# Patient Record
Sex: MALE | Race: OTHER | NOT HISPANIC OR LATINO | Employment: STUDENT | URBAN - METROPOLITAN AREA
[De-identification: names, ages, dates, MRNs, and addresses within clinical notes are randomized per-mention and may not be internally consistent; named-entity substitution may affect disease eponyms.]

---
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
## 2025-04-12 ENCOUNTER — HOSPITAL ENCOUNTER (EMERGENCY)
Facility: OTHER | Age: 20
Discharge: PSYCHIATRIC OR SUBSTANCE USE FACILITY | End: 2025-04-12
Attending: EMERGENCY MEDICINE
Payer: COMMERCIAL

## 2025-04-12 VITALS
HEART RATE: 50 BPM | OXYGEN SATURATION: 100 % | HEIGHT: 72 IN | RESPIRATION RATE: 12 BRPM | DIASTOLIC BLOOD PRESSURE: 62 MMHG | SYSTOLIC BLOOD PRESSURE: 128 MMHG | TEMPERATURE: 98 F | WEIGHT: 150 LBS | BODY MASS INDEX: 20.32 KG/M2

## 2025-04-12 DIAGNOSIS — F29 PSYCHOSIS, UNSPECIFIED PSYCHOSIS TYPE: ICD-10-CM

## 2025-04-12 DIAGNOSIS — Z00.8 MEDICAL CLEARANCE FOR PSYCHIATRIC ADMISSION: Primary | ICD-10-CM

## 2025-04-12 LAB
ABSOLUTE EOSINOPHIL (OHS): 0.02 K/UL
ABSOLUTE MONOCYTE (OHS): 0.55 K/UL (ref 0.3–1)
ABSOLUTE NEUTROPHIL COUNT (OHS): 4.1 K/UL (ref 1.8–7.7)
ALBUMIN SERPL BCP-MCNC: 4.4 G/DL (ref 3.5–5.2)
ALP SERPL-CCNC: 82 UNIT/L (ref 40–150)
ALT SERPL W/O P-5'-P-CCNC: 20 UNIT/L (ref 10–44)
AMPHET UR QL SCN: NEGATIVE
ANION GAP (OHS): 12 MMOL/L (ref 8–16)
APAP SERPL-MCNC: <3 UG/ML (ref 10–20)
AST SERPL-CCNC: 28 UNIT/L (ref 11–45)
BARBITURATE SCN PRESENT UR: NEGATIVE
BASOPHILS # BLD AUTO: 0.01 K/UL
BASOPHILS NFR BLD AUTO: 0.2 %
BENZODIAZ UR QL SCN: NEGATIVE
BILIRUB SERPL-MCNC: 0.4 MG/DL (ref 0.1–1)
BILIRUB UR QL STRIP.AUTO: NEGATIVE
BUN SERPL-MCNC: 18 MG/DL (ref 6–20)
CALCIUM SERPL-MCNC: 9.7 MG/DL (ref 8.7–10.5)
CANNABINOIDS UR QL SCN: ABNORMAL
CHLORIDE SERPL-SCNC: 104 MMOL/L (ref 95–110)
CLARITY UR: CLEAR
CO2 SERPL-SCNC: 21 MMOL/L (ref 23–29)
COCAINE UR QL SCN: NEGATIVE
COLOR UR AUTO: YELLOW
CREAT SERPL-MCNC: 1.1 MG/DL (ref 0.5–1.4)
CREAT UR-MCNC: 267.5 MG/DL (ref 23–375)
ERYTHROCYTE [DISTWIDTH] IN BLOOD BY AUTOMATED COUNT: 12.8 % (ref 11.5–14.5)
ETHANOL SERPL-MCNC: <10 MG/DL
GFR SERPLBLD CREATININE-BSD FMLA CKD-EPI: >60 ML/MIN/1.73/M2
GLUCOSE SERPL-MCNC: 102 MG/DL (ref 70–110)
GLUCOSE UR QL STRIP: NEGATIVE
HCT VFR BLD AUTO: 41.9 % (ref 40–54)
HGB BLD-MCNC: 14.5 GM/DL (ref 14–18)
HGB UR QL STRIP: NEGATIVE
IMM GRANULOCYTES # BLD AUTO: 0.02 K/UL (ref 0–0.04)
IMM GRANULOCYTES NFR BLD AUTO: 0.3 % (ref 0–0.5)
KETONES UR QL STRIP: NEGATIVE
LEUKOCYTE ESTERASE UR QL STRIP: NEGATIVE
LYMPHOCYTES # BLD AUTO: 1.27 K/UL (ref 1–4.8)
MCH RBC QN AUTO: 29.2 PG (ref 27–31)
MCHC RBC AUTO-ENTMCNC: 34.6 G/DL (ref 32–36)
MCV RBC AUTO: 84 FL (ref 82–98)
METHADONE UR QL SCN: NEGATIVE
NITRITE UR QL STRIP: NEGATIVE
NUCLEATED RBC (/100WBC) (OHS): 0 /100 WBC
OPIATES UR QL SCN: NEGATIVE
PCP UR QL: NEGATIVE
PH UR STRIP: 6 [PH]
PLATELET # BLD AUTO: 240 K/UL (ref 150–450)
PMV BLD AUTO: 9.7 FL (ref 9.2–12.9)
POTASSIUM SERPL-SCNC: 4.7 MMOL/L (ref 3.5–5.1)
PROT SERPL-MCNC: 8.2 GM/DL (ref 6–8.4)
PROT UR QL STRIP: ABNORMAL
RBC # BLD AUTO: 4.97 M/UL (ref 4.6–6.2)
RELATIVE EOSINOPHIL (OHS): 0.3 %
RELATIVE LYMPHOCYTE (OHS): 21.3 % (ref 18–48)
RELATIVE MONOCYTE (OHS): 9.2 % (ref 4–15)
RELATIVE NEUTROPHIL (OHS): 68.7 % (ref 38–73)
SODIUM SERPL-SCNC: 137 MMOL/L (ref 136–145)
SP GR UR STRIP: 1.03
TSH SERPL-ACNC: 1.36 UIU/ML (ref 0.4–4)
UROBILINOGEN UR STRIP-ACNC: NEGATIVE EU/DL
WBC # BLD AUTO: 5.97 K/UL (ref 3.9–12.7)

## 2025-04-12 PROCEDURE — 85025 COMPLETE CBC W/AUTO DIFF WBC: CPT

## 2025-04-12 PROCEDURE — 80053 COMPREHEN METABOLIC PANEL: CPT

## 2025-04-12 PROCEDURE — G0425 INPT/ED TELECONSULT30: HCPCS | Mod: GT,,, | Performed by: PSYCHIATRY & NEUROLOGY

## 2025-04-12 PROCEDURE — 99285 EMERGENCY DEPT VISIT HI MDM: CPT

## 2025-04-12 PROCEDURE — 80143 DRUG ASSAY ACETAMINOPHEN: CPT

## 2025-04-12 PROCEDURE — 82077 ASSAY SPEC XCP UR&BREATH IA: CPT

## 2025-04-12 PROCEDURE — 84443 ASSAY THYROID STIM HORMONE: CPT

## 2025-04-12 PROCEDURE — 80307 DRUG TEST PRSMV CHEM ANLYZR: CPT

## 2025-04-12 PROCEDURE — 81003 URINALYSIS AUTO W/O SCOPE: CPT

## 2025-04-12 NOTE — ED NOTES
Patient Belongings:   1 tan pair slippers   1 pair white and blue socks   1 multicolor t-shirt   1 pair blue pants       Patient Valuables ([id number]):  1 cell phone  1 pair airpods in case   1 silver wallet   1 [place] probationary license   1 broken [employer] [facility] student ID   $5 bill

## 2025-04-12 NOTE — CONSULTS
[facility]  Psychiatry  Telepsychiatry Consult Note    Please see previous notes:    Patient agreeable to consultation via telepsychiatry.  Some time maybe that is 45 minutes away [facility] the leave a 45 minutes he might want to come fatigued or something  Tele-Consultation from Psychiatry started: 4/12/2025 at 247 pm  The chief complaint leading to psychiatric consultation is: threathening behavior      Pt brought by [facility] police called them to transport pt, pts roommates said he called his parents and was threatening to kill them.     This consultation was requested by Arden Patel PA-C , the Emergency Department attending physician.  The location of the consulting psychiatrist is [facility]  The patient location is  [facility] EMERGENCY DEPARTMENT   The patient arrived at the ED at: this afternoon    Also present with the patient at the time of the consultation: rossy    Patient Identification:   Gogo Watson is a 20 y.o. male.    Patient information was obtained from patient and primary team.  Patient presented involuntarily to the Emergency Department by police    Inpatient consult to Telemedicine - Psychiatry  Consult performed by: Angi Andrade MD  Consult ordered by: Arden Patel PA-C  Reason for consult: threathening behavior        Teleconsult Time Documentation      sent for evaluation        Pt brought by [facility] police called them to transport pt, pts roommates said he called his parents and was threatening to kill them.       Gogo Watson is a 20 y.o. male [employer] with no pertinent past medical history presenting to the emergency department via [facility] for evaluation of mental health. It was reported that his roommate called the police on him after he got angry with his parents over the phone. It was reported that he threatened to kill them via phone. He reports taking LSD approximately 3-4 days ago and then spoke to his parents where he had an angry outburst  toward them. He currently denies associated SI, HI, or AH/VH. He states his parents do live in [place]. He states he has no access to any weapons. He states that he does not feel safe in his current living arrangement, and feels being watched by his roommates.  He reports occasional LSD use, daily tobacco use and daily marijuana use (most recently this morning). He denies any alcohol consumption. He currently denies any medical complaints or concerns.   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  He has been using acid fairly regularly for the last year and smokes marijuana daily.  Several days ago his roommates witnessed him threatened to kill his parents during an phone argument with them.  They live in [place].  His behavior has been odd and suspicious seeming to them apparently.  They called the police today to have him brought in for evaluation stating that they do not feel safe.  On interview the patient is initially calm and cooperative.  Later on when challenged somewhat more he becomes irritable.  He admits to having yelled at his parents a few days ago out of anger that they were not more supportive of the expression of emotion during his childhood.  He admits to the substance use.  He states that he has only been micro dosing this year taking a 6th of a tab which he estimates his 30 mcg every 3 days.  He states he does this to get in touch with his emotions.  He reports that sometimes he experiences paranoia when high but he is unaware of paranoia at other times.  He states that sometimes he feels that he is being watched when he uses and laughs when making this statement.  He does not have awareness of other or any other negative sequelae and isn't concerned about his substance use.  He is also using nicotine pouches daily.  He denies depressed mood.  He denies suicidal or homicidal ideation.  He does not feel  "that he is paranoid.  However he admits that when he uses substances he has been experiencing more paranoid recently.  He admits that he did not sleep at all last night due to feeling that something was off and it has not been safe for me."  He states there is many reasons why he is unsafe.  He states he could talk about them but he will not.  He does not feel safe living with his roommates.  He will not elaborate.  His plan were he to be discharged is to go visit his grandmother he states.  He then been backpacks and states he would not leave school but admits he does not have another place to live.  I attempted to engage him in a conversation about having made threats to harm other people in the same week that he stating that he feels that the people he lives with are dangerous to him and this being of concern.  He became intensely irritable at that point and argumentative but under control.   Remainder of the interview deferred for that reason    Psychiatric History:   Previous Psychiatric Hospitalizations: No       Previous Suicide Attempts: no    History of Violence: no       Substance Abuse History:  Tobacco:Yes  Alcohol: Yes  Illicit Substances:Yes        Social History:  Developmental/Childhood:Achieved all developmental milestones timely  *Education: College student  Employment Status/Finances: Student       Housing Status:  With [employer] [facility] roommates     history:  NO       Psychiatric Mental Status Exam:  Arousal: alert  Sensorium/Orientation: oriented to grossly intact  Behavior/Cooperation: normal, cooperative   Speech: normal tone, normal rate, normal pitch, normal volume  Language: grossly intact  Mood: " I'm fine "   Affect: labile  Thought Process: illogical  Thought Content:   Auditory hallucinations: NO  Visual hallucinations: NO  Paranoia: YES:       Delusions:   Probable    Suicidal ideation: NO  Homicidal ideation: recent threat but denies  Attention/Concentration:  intact  Memory: "    Recent:  Decreased   Remote: Intact   3/3 immediate,  /3 at 5 min  Fund of Knowledge: Aware of current events   Abstract reasoning:  Defer  Insight: poor awareness of illness  Judgment: limited      Past Medical History: History reviewed. No pertinent past medical history.   Laboratory Data:   Labs Reviewed   COMPREHENSIVE METABOLIC PANEL - Abnormal       Result Value    Sodium 137      Potassium 4.7      Chloride 104      CO2 21 (*)     Glucose 102      BUN 18      Creatinine 1.1      Calcium 9.7      Protein Total 8.2      Albumin 4.4      Bilirubin Total 0.4      ALP 82      AST 28      ALT 20      Anion Gap 12      eGFR >60      Narrative:     Specimen slightly hemolyzed.     URINALYSIS, REFLEX TO URINE CULTURE - Abnormal    Color, UA Yellow      Appearance, UA Clear      pH, UA 6.0      Spec Grav UA 1.030      Protein, UA Trace (*)     Glucose, UA Negative      Ketones, UA Negative      Bilirubin, UA Negative      Blood, UA Negative      Nitrites, UA Negative      Urobilinogen, UA Negative      Leukocyte Esterase, UA Negative     ACETAMINOPHEN LEVEL - Abnormal    Acetaminophen Level <3.0 (*)    ALCOHOL,MEDICAL (ETHANOL) - Normal    Alcohol, Serum <10     CBC WITH DIFFERENTIAL - Normal    WBC 5.97      RBC 4.97      HGB 14.5      HCT 41.9      MCV 84      MCH 29.2      MCHC 34.6      RDW 12.8      Platelet Count 240      MPV 9.7      Nucleated RBC 0      Neut % 68.7      Lymph % 21.3      Mono % 9.2      Eos % 0.3      Basophil % 0.2      Imm Grans % 0.3      Neut # 4.10      Lymph # 1.27      Mono # 0.55      Eos # 0.02      Baso # 0.01      Imm Grans # 0.02     CBC W/ AUTO DIFFERENTIAL    Narrative:     The following orders were created for panel order CBC auto differential.  Procedure                               Abnormality         Status                     ---------                               -----------         ------                     CBC with Differential[[id number]]        Normal               Final result                 Please view results for these tests on the individual orders.   TSH   DRUG SCREEN PANEL, URINE EMERGENCY   GREY TOP URINE HOLD          Allergies:    Review of patient's allergies indicates:  No Known Allergies    Medications in ER: Medications - No data to display    Medications at home:  None    No new subjective & objective note has been filed under this hospital service since the last note was generated.      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  Psychoactive substance use disorder cannabis use disorder.  Gravely disabled and possibly a threat to others.      Rec:  Inpatient psychiatric hospitalization for a full evaluation stabilization and treatment     Plan of Care communicated to: Dr. Mckee    Time with patient: 30 min      More than 50% of the time was spent counseling/coordinating care    Consulting clinician was informed of the encounter and consult note.    Consultation ended: 4/12/2025 at 217  pm    Angi Andrade MD   Psychiatry  [facility]

## 2025-04-12 NOTE — ED NOTES
Pt's mom called ER and was updated by me that we do not have consent to share more information on pt. Pt confirmed that he does not consent to share information with any family.

## 2025-04-12 NOTE — ED NOTES
Pt requesting that I notify his parents, next of kin. [facility] patient's mother at [phone] and Wm patient's father. [phone]

## 2025-04-12 NOTE — ED PROVIDER NOTES
Encounter Date: 4/12/2025    SCRIBE #1 NOTE: IMarsha, am scribing for, and in the presence of,  Arden Patel PA-C. I have scribed the following portions of the note - Other sections scribed: HPI, ROS, PE.   SCRIBE #2 NOTE: IAustin, am scribing for, and in the presence of,  Arden Patel PA-C. I have scribed the remaining portions of the note not scribed by Scribe #1.     History     Chief Complaint   Patient presents with    sent for evaluation     Pt brought by [facility] states [facility] police called them to transport pt, pts roommates said he called his parents and was threatening to kill them.      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 It was reported that his roommate called the police on him after he got angry with his parents over the phone. It was reported that he threatened to kill them via phone. He reports taking LSD approximately 3-4 days ago and then spoke to his parents where he had an angry outburst toward them. He currently denies associated SI, HI, or AH/VH. He states his parents do live in [place]. He states he has no access to any weapons. He states that he does not feel safe in his current living arrangement, and feels being watched by his roommates.  He reports occasional LSD use, daily tobacco use and daily marijuana use (most recently this morning). He denies any alcohol consumption. He currently denies any medical complaints or concerns.     The history is provided by the patient.     Review of patient's allergies indicates:  No Known Allergies  History reviewed. No pertinent past medical history.  History reviewed. No pertinent surgical history.  No family history on file.  Social History[1]  Review of Systems   Constitutional:  Negative for chills and fever.   HENT:  Negative for congestion, rhinorrhea and sore throat.    Respiratory:  Negative for  cough and shortness of breath.    Cardiovascular:  Negative for chest pain.   Gastrointestinal:  Negative for abdominal pain, diarrhea, nausea and vomiting.   Genitourinary:  Negative for dysuria, frequency and urgency.   Musculoskeletal:  Negative for back pain.   Skin:  Negative for rash.   Neurological:  Negative for dizziness and headaches.   Psychiatric/Behavioral:  Negative for confusion.        Physical Exam     Initial Vitals [04/12/25 1334]   BP Pulse Resp Temp SpO2   136/73 70 18 98 °F (36.7 °C) 100 %      MAP       --         Physical Exam    Vitals reviewed.  Constitutional: He appears well-developed and well-nourished. No distress.   HENT:   Head: Normocephalic and atraumatic.   Eyes: Conjunctivae and EOM are normal.   Neck: Neck supple.   Normal range of motion.  Cardiovascular:  Normal rate, regular rhythm, normal heart sounds and intact distal pulses.           Pulmonary/Chest: Breath sounds normal. No respiratory distress. He has no wheezes. He has no rhonchi. He has no rales. He exhibits no tenderness.   Abdominal: Abdomen is soft. Bowel sounds are normal. He exhibits no distension and no mass. There is no abdominal tenderness. There is no rebound and no guarding.   Musculoskeletal:         General: No edema. Normal range of motion.      Cervical back: Normal range of motion and neck supple.     Neurological: He is alert and oriented to person, place, and time. He has normal strength.   Skin: Skin is warm and dry.   Psychiatric:   Calm and cooperative; flat affect; no HI, SI, delusions, or hallucinations         ED Course   Procedures  Labs Reviewed   COMPREHENSIVE METABOLIC PANEL - Abnormal       Result Value    Sodium 137      Potassium 4.7      Chloride 104      CO2 21 (*)     Glucose 102      BUN 18      Creatinine 1.1      Calcium 9.7      Protein Total 8.2      Albumin 4.4      Bilirubin Total 0.4      ALP 82      AST 28      ALT 20      Anion Gap 12      eGFR >60      Narrative:     Specimen  slightly hemolyzed.     URINALYSIS, REFLEX TO URINE CULTURE - Abnormal    Color, UA Yellow      Appearance, UA Clear      pH, UA 6.0      Spec Grav UA 1.030      Protein, UA Trace (*)     Glucose, UA Negative      Ketones, UA Negative      Bilirubin, UA Negative      Blood, UA Negative      Nitrites, UA Negative      Urobilinogen, UA Negative      Leukocyte Esterase, UA Negative     ACETAMINOPHEN LEVEL - Abnormal    Acetaminophen Level <3.0 (*)    TSH - Normal    TSH 1.360     ALCOHOL,MEDICAL (ETHANOL) - Normal    Alcohol, Serum <10     CBC WITH DIFFERENTIAL - Normal    WBC 5.97      RBC 4.97      HGB 14.5      HCT 41.9      MCV 84      MCH 29.2      MCHC 34.6      RDW 12.8      Platelet Count 240      MPV 9.7      Nucleated RBC 0      Neut % 68.7      Lymph % 21.3      Mono % 9.2      Eos % 0.3      Basophil % 0.2      Imm Grans % 0.3      Neut # 4.10      Lymph # 1.27      Mono # 0.55      Eos # 0.02      Baso # 0.01      Imm Grans # 0.02     CBC W/ AUTO DIFFERENTIAL    Narrative:     The following orders were created for panel order CBC auto differential.  Procedure                               Abnormality         Status                     ---------                               -----------         ------                     CBC with Differential[[id number]]        Normal              Final result                 Please view results for these tests on the individual orders.   DRUG SCREEN PANEL, URINE EMERGENCY   GREY TOP URINE HOLD          Imaging Results    None          Medications - No data to display  Medical Decision Making  Amount and/or Complexity of Data Reviewed  External Data Reviewed: notes.  Labs: ordered. Decision-making details documented in ED Course.            Scribe Attestation:   Scribe #1: I performed the above scribed service and the documentation accurately describes the services I performed. I attest to the accuracy of the note.  Scribe #2: I performed the above scribed service and the  "documentation accurately describes the services I performed. I attest to the accuracy of the note.              Provider Attestation for Scribe: I, Arden Patel PA-C, reviewed documentation as scribed in my presence, which is both accurate and complete.       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 Per U report, pt's roommates called the [facility] after they heard his argument with his parents over the phone in which he "threatened to kill them." Patient admits that he did have an argument with his parents over the phone 3-4 days ago while he was under the influence of LSD. He admits to taking LSD intermittently. He currently denies HI, SI, auditory hallucinations, visual hallucinations. He states that he does not feel safe at his place with his roommates but does not elaborate on it. He states that he feels like he is being watched by his roommates. He does report daily marijuana and tobacco use. He does not have any medical complaints or concerns today.  He denies use of daily medications.  On exam, he is well-appearing and nontoxic.  Hemodynamically stable.  Afebrile in the ED. Physical exam is benign.  Flat affect.  Calm and cooperative.  No HI, SI, AH/VH or delusions.  Plan for telepsych consult.  Clinical Tests:   Lab Tests: Ordered and Reviewed  ED Management:  On review of labs, no leukocytosis or anemia.  Normal platelet count.  CMP is unremarkable.  TSH within normal limits.  Serum ethanol level is less than 10.  No UTI on UA.  Drug screen panel pending.    Patient was evaluated by telepsych provider, Dr. Angi Andrade who does recommend PEC and inpatient hospitalization for further "evaluation, stabilization and treatment for acute psychosis secondary to substance abuse, psychoactive substance use disorder, cannabis use disorder." " Dr. Andrade does note the patient is gravely disabled in her chart.  I informed the patient that Dr. Andrade does recommend PEC for him and that I agree with this plan. He is upset and agitated but redirectable. PEC signed. He is medically cleared for transfer to psychiatric facility for further evaluation, stabilization and management.              Clinical Impression:  Final diagnoses:  [Z00.8] Medical clearance for psychiatric admission (Primary)  [F29] Psychosis, unspecified psychosis type          ED Disposition Condition    Transfer to Psych Facility Stable          ED Prescriptions    None       Follow-up Information    None            Arden Patel PA-C  04/12/25 1547         [1]   Social History  Tobacco Use    Smoking status: Never    Smokeless tobacco: Never   Substance Use Topics    Alcohol use: Never    Drug use: Never        Arden Patel PA-C  04/12/25 9414

## 2025-04-12 NOTE — ED NOTES
I spoke with patient's mother on the phone and provided update. She will update patient's father as he is currently on a plane headed down.

## 2025-04-12 NOTE — ED TRIAGE NOTES
Pt states that his roommates called the police and said he was making threatening statements. He states that his roommates have been controlling him and keeping him from seeing people, and that he does not feel safe with him. Pt is calm, cooperative and appropriate.

## 2025-04-12 NOTE — ED NOTES
I updated patient regarding transfer to [facility]. Pt states that he does not want me to update his parents and revoked his consent for me to tell his parents where he is being transferred. Pt is aware that his dad is on a plane on his way to [place] to see him.

## 2025-04-13 PROBLEM — F12.10 TETRAHYDROCANNABINOL (THC) USE DISORDER, MILD, ABUSE: Status: ACTIVE | Noted: 2025-04-13

## 2025-04-13 PROBLEM — Z13.9 ENCOUNTER FOR MEDICAL SCREENING EXAMINATION: Status: ACTIVE | Noted: 2025-04-13

## 2025-04-13 PROBLEM — R03.0 ELEVATED BP WITHOUT DIAGNOSIS OF HYPERTENSION: Status: ACTIVE | Noted: 2025-04-13

## 2025-04-13 NOTE — ED NOTES
MIGUEL ANGEL arrived and given original PEC, copy of chart, transfer form, face sheet. Security gave all belongings and valuables to MIGUEL ANGEL to take with [facility] to [facility].

## 2025-04-13 NOTE — ED NOTES
[facility] nurse aware that patient's father is in town and has his number. Also aware pt does not consent to share any information with either parent. Pt awaits transport.

## 2025-04-13 NOTE — ED NOTES
Pt's dad arrived in ER and brought additional belongings placed in new bag #[id number]  White  with cable

## 2025-04-13 NOTE — ED NOTES
Pt continues to decline food. Meal trays were brought by dietary throughout day but pt declined them.

## 2025-04-16 PROBLEM — F19.959 SUBSTANCE-INDUCED PSYCHOTIC DISORDER: Status: ACTIVE | Noted: 2025-04-16

## 2025-04-16 PROBLEM — F16.10: Status: ACTIVE | Noted: 2025-04-16
